# Patient Record
Sex: MALE | Race: WHITE | Employment: UNEMPLOYED | URBAN - NONMETROPOLITAN AREA
[De-identification: names, ages, dates, MRNs, and addresses within clinical notes are randomized per-mention and may not be internally consistent; named-entity substitution may affect disease eponyms.]

---

## 2021-06-15 ENCOUNTER — HOSPITAL ENCOUNTER (OUTPATIENT)
Dept: CT IMAGING | Age: 51
Discharge: HOME | End: 2021-06-15

## 2021-06-15 DIAGNOSIS — Z00.6 EXAMINATION FOR NORMAL COMPARISON FOR CLINICAL RESEARCH: ICD-10-CM

## 2021-06-17 ENCOUNTER — OFFICE VISIT (OUTPATIENT)
Dept: SURGERY | Age: 51
End: 2021-06-17
Payer: COMMERCIAL

## 2021-06-17 ENCOUNTER — TELEPHONE (OUTPATIENT)
Dept: SURGERY | Age: 51
End: 2021-06-17

## 2021-06-17 VITALS
DIASTOLIC BLOOD PRESSURE: 78 MMHG | RESPIRATION RATE: 18 BRPM | TEMPERATURE: 97.6 F | OXYGEN SATURATION: 97 % | BODY MASS INDEX: 31.52 KG/M2 | HEART RATE: 83 BPM | SYSTOLIC BLOOD PRESSURE: 110 MMHG | WEIGHT: 208 LBS | HEIGHT: 68 IN

## 2021-06-17 DIAGNOSIS — R10.32 BILATERAL GROIN PAIN: Primary | ICD-10-CM

## 2021-06-17 DIAGNOSIS — R10.31 BILATERAL GROIN PAIN: Primary | ICD-10-CM

## 2021-06-17 PROCEDURE — G8417 CALC BMI ABV UP PARAM F/U: HCPCS | Performed by: SURGERY

## 2021-06-17 PROCEDURE — G8427 DOCREV CUR MEDS BY ELIG CLIN: HCPCS | Performed by: SURGERY

## 2021-06-17 PROCEDURE — 3017F COLORECTAL CA SCREEN DOC REV: CPT | Performed by: SURGERY

## 2021-06-17 PROCEDURE — 99203 OFFICE O/P NEW LOW 30 MIN: CPT | Performed by: SURGERY

## 2021-06-17 PROCEDURE — 1036F TOBACCO NON-USER: CPT | Performed by: SURGERY

## 2021-06-17 RX ORDER — LANOLIN ALCOHOL/MO/W.PET/CERES
1 CREAM (GRAM) TOPICAL DAILY
COMMUNITY
Start: 2021-06-01

## 2021-06-17 RX ORDER — ASCORBIC ACID 500 MG
500 TABLET ORAL DAILY
COMMUNITY

## 2021-06-17 RX ORDER — MELATONIN
1 DAILY
COMMUNITY
Start: 2021-06-15

## 2021-06-17 RX ORDER — CETIRIZINE HYDROCHLORIDE 10 MG/1
1 TABLET ORAL DAILY
COMMUNITY
Start: 2021-06-15

## 2021-06-17 RX ORDER — FLUOXETINE HYDROCHLORIDE 40 MG/1
1 CAPSULE ORAL NIGHTLY
COMMUNITY
Start: 2021-04-28

## 2021-06-17 RX ORDER — FAMOTIDINE 20 MG/1
1 TABLET, FILM COATED ORAL NIGHTLY
COMMUNITY
Start: 2021-05-06

## 2021-06-17 ASSESSMENT — ENCOUNTER SYMPTOMS
WHEEZING: 0
COLOR CHANGE: 0
NAUSEA: 0
BLOOD IN STOOL: 0
SHORTNESS OF BREATH: 0
COUGH: 0
SORE THROAT: 0
TROUBLE SWALLOWING: 0
VOMITING: 0
ABDOMINAL PAIN: 0

## 2021-06-17 NOTE — PROGRESS NOTES
Lloyd Arias MD   General Surgery  New Patient Evaluation in Office  Pt Name: Shilpa Jameson  Date of Birth 1970   Today's Date: 6/17/2021  Medical Record Number: [id number]  Referring Provider: Phoenix Brennan MD  Primary Care Provider: TERESA Max CNP  Chief Complaint:  Chief Complaint   Patient presents with    Surgical Consult     New patient ref Dr. Bradley Hernandez-- bilat inguinal hernia & umbilical hernia-- has colonoscopy 7/1 with Dr. Prosper Hutchison      1. Bilateral groin pain    2. Possible recurrent left inguinal hernia   3. Right inguinal floor weakness  PLANS      1. Outside imaging reviewed. Reported recurrent left inguinal hernia containing colon which I can visualize but cannot palpate on exam.  He does have weakness in the right inguinal floor. 2.  Schedule patient for bilateral ultrasound to further evaluate. Follow-up in office following ultrasound exam to see if can see peristalsing bowel in the left groin. He may have significant lipomas of the cord as well. Reexamine on next visit. 3.  Patient has continued symptomatology and pending ultrasound and reexamination may warrant laparoscopy and repair of recurrent hernia and primary repair of right inguinal hernia. Patient expressed understanding all questions answered. He was reassured he does not need any urgent or emergent surgery at present. Johanna Camejo is a 46y.o. year old male who is presenting today in the office for evaluation of a recurrent recurrent left inguinal hernia and possible right inguinal hernia. He has gone open repair of left inguinal hernia x2 in the past both by an open approach his first was several years ago and was performed in [facility]. In 2020 he had an open repair of a recurrent left inguinal hernia at [facility] in [place], [place].    He was having some right groin discomfort was seen apparently in the emergency department there and had a CT scan of the abdomen and pelvis performed. Showed the sigmoid colon in the left groin and a 5 cm hernia in the right groin. He has a weakness in the floor of the right inguinal canal but no significant herniating contents. I could not discretely palpate herniating bowel or herniation on the left today on examination. He denies any testicular pain. He has scarring at the umbilicus but no true herniating abdominal contents on CT scan. Patient was referred for laparoscopic capable surgeon. He denies any current testicular pain or bulging in either groin. He does complain of some symptoms of sciatica. He complains of pain with cough or heavy lifting. Past Medical History  History reviewed. No pertinent past medical history. Past Surgical History  Past Surgical History:   Procedure Laterality Date    HERNIA REPAIR  60/27/4752    umbilical hernia-- recurrent left inguinal hernia    INGUINAL HERNIA REPAIR Left 2014       Medications  Current Outpatient Medications   Medication Sig Dispense Refill    famotidine (PEPCID) 20 MG tablet Take 1 tablet by mouth nightly      FLUoxetine (PROZAC) 40 MG capsule Take 1 capsule by mouth nightly      vitamin B-12 (CYANOCOBALAMIN) 1000 MCG tablet Take 1 tablet by mouth daily      vitamin D3 (CHOLECALCIFEROL) 25 MCG (1000 UT) TABS tablet Take 1 tablet by mouth daily      cetirizine (ZYRTEC) 10 MG tablet Take 1 tablet by mouth daily      ascorbic acid (VITAMIN C) 500 MG tablet Take 500 mg by mouth daily       No current facility-administered medications for this visit.      Allergies   No Known Allergies    Family History  Family History   Problem Relation Age of Onset    Emphysema Mother     Prostate Cancer Father     Prostate Cancer Paternal Grandfather        SocialHistory  Social History     Socioeconomic History    Marital status:      Spouse name: Not on file    Number of children: Not on file    Years of education: Not on file    Highest education level: Not on file   Occupational History    Not on file   Tobacco Use    Smoking status: Never Smoker    Smokeless tobacco: Never Used   Substance and Sexual Activity    Alcohol use: Never    Drug use: Never    Sexual activity: Not on file   Other Topics Concern    Not on file   Social History Narrative    Not on file     Social Determinants of Health     Financial Resource Strain:     Difficulty of Paying Living Expenses:    Food Insecurity:     Worried About Running Out of Food in the Last Year:     [address] in the Last Year:    Transportation Needs:     Lack of Transportation (Medical):  Lack of Transportation (Non-Medical):    Physical Activity:     Days of Exercise per Week:     Minutes of Exercise per Session:    Stress:     Feeling of Stress :    Social Connections:     Frequency of Communication with Friends and Family:     Frequency of Social Gatherings with Friends and Family:     Attends Hinduism Services:     Active Member of Clubs or Organizations:     Attends Club or Organization Meetings:     Marital Status:    Intimate Partner Violence:     Fear of Current or Ex-Partner:     Emotionally Abused:     Physically Abused:     Sexually Abused:            Review of Systems  Review of Systems   Constitutional: Negative for chills, fatigue, fever and unexpected weight change. HENT: Negative for sore throat and trouble swallowing. Eyes: Negative for visual disturbance. Respiratory: Negative for cough, shortness of breath and wheezing. Cardiovascular: Negative for chest pain and palpitations. Gastrointestinal: Negative for abdominal pain, blood in stool, nausea and vomiting. Endocrine: Negative for cold intolerance, heat intolerance and polydipsia. Genitourinary: Negative for decreased urine volume, dysuria, flank pain, hematuria, scrotal swelling and testicular pain. Musculoskeletal: Negative for gait problem, joint swelling and myalgias.    Skin: Negative for color change and rash. Allergic/Immunologic: Negative for immunocompromised state. Neurological: Negative for dizziness, tremors, seizures and speech difficulty. Hematological: Does not bruise/bleed easily. Psychiatric/Behavioral: Negative for behavioral problems, confusion and suicidal ideas. OBJECTIVE     /78 (Site: Right Upper Arm, Position: Sitting, Cuff Size: Medium Adult)   Pulse 83   Temp 97.6 °F (36.4 °C) (Temporal)   Resp 18   Ht 5' 8\" (1.727 m)   Wt 208 lb (94.3 kg)   SpO2 97%   BMI 31.63 kg/m²      Physical Exam  Vitals reviewed. Constitutional:       Appearance: He is well-developed. He is not ill-appearing or diaphoretic. HENT:      Head: Normocephalic and atraumatic. Eyes:      Extraocular Movements: Extraocular movements intact. Pupils: Pupils are equal, round, and reactive to light. Neck:      Thyroid: No thyromegaly. Vascular: No JVD. Trachea: No tracheal deviation. Cardiovascular:      Rate and Rhythm: Normal rate and regular rhythm. Heart sounds: Normal heart sounds. No murmur heard. Pulmonary:      Effort: Pulmonary effort is normal. No respiratory distress. Breath sounds: Normal breath sounds. No wheezing. Abdominal:      General: Bowel sounds are normal. There is no distension. Palpations: Abdomen is soft. Tenderness: There is no abdominal tenderness. There is no guarding or rebound. Comments: Weakness floor of right inguinal canal.  No significant herniating contents with Valsalva. No herniating contents with Valsalva in the left groin on exam today   Musculoskeletal:      Cervical back: No rigidity. Right lower leg: No edema. Left lower leg: No edema. Skin:     General: Skin is warm and dry. Coloration: Skin is not jaundiced or pale. Findings: No rash. Neurological:      General: No focal deficit present. Mental Status: He is alert and oriented to person, place, and time.

## 2021-06-17 NOTE — TELEPHONE ENCOUNTER
Spoke w/ pt, bilateral groin US scheduled [facility] 6/21 @ 10:30 am.  F/U with Dr. Charles Aranda scheduled 7/12 @ 11:45 am.  Pt voiced understanding    Orders faxed to [facility] F# [phone]

## 2021-06-29 ENCOUNTER — HOSPITAL ENCOUNTER (OUTPATIENT)
Dept: ULTRASOUND IMAGING | Age: 51
Discharge: HOME | End: 2021-06-29
Payer: COMMERCIAL

## 2021-06-29 DIAGNOSIS — Z00.6 EXAMINATION FOR NORMAL COMPARISON FOR CLINICAL RESEARCH: ICD-10-CM

## 2021-07-12 ENCOUNTER — TELEPHONE (OUTPATIENT)
Dept: SURGERY | Age: 51
End: 2021-07-12

## 2021-07-12 NOTE — TELEPHONE ENCOUNTER
Results of US given to patient- pt voices understanding- to keep appointment made for 7/19/21 to discuss further plan of care.

## 2021-10-18 ENCOUNTER — OFFICE VISIT (OUTPATIENT)
Dept: SURGERY | Age: 51
End: 2021-10-18
Payer: COMMERCIAL

## 2021-10-18 VITALS
OXYGEN SATURATION: 95 % | RESPIRATION RATE: 15 BRPM | SYSTOLIC BLOOD PRESSURE: 129 MMHG | HEART RATE: 73 BPM | WEIGHT: 210 LBS | DIASTOLIC BLOOD PRESSURE: 73 MMHG | TEMPERATURE: 97.4 F | BODY MASS INDEX: 31.83 KG/M2 | HEIGHT: 68 IN

## 2021-10-18 DIAGNOSIS — R10.32 BILATERAL GROIN PAIN: Primary | ICD-10-CM

## 2021-10-18 DIAGNOSIS — R10.31 BILATERAL GROIN PAIN: Primary | ICD-10-CM

## 2021-10-18 PROCEDURE — 99213 OFFICE O/P EST LOW 20 MIN: CPT | Performed by: SURGERY

## 2021-10-18 PROCEDURE — 1036F TOBACCO NON-USER: CPT | Performed by: SURGERY

## 2021-10-18 PROCEDURE — G8484 FLU IMMUNIZE NO ADMIN: HCPCS | Performed by: SURGERY

## 2021-10-18 PROCEDURE — G8427 DOCREV CUR MEDS BY ELIG CLIN: HCPCS | Performed by: SURGERY

## 2021-10-18 PROCEDURE — G8417 CALC BMI ABV UP PARAM F/U: HCPCS | Performed by: SURGERY

## 2021-10-18 PROCEDURE — 3017F COLORECTAL CA SCREEN DOC REV: CPT | Performed by: SURGERY

## 2021-10-18 ASSESSMENT — ENCOUNTER SYMPTOMS
SHORTNESS OF BREATH: 0
WHEEZING: 0
TROUBLE SWALLOWING: 0
NAUSEA: 0
ABDOMINAL PAIN: 0
VOMITING: 0
COLOR CHANGE: 0
BLOOD IN STOOL: 0
COUGH: 0

## 2021-10-18 NOTE — PROGRESS NOTES
Teresa Canales MD   General Surgery  New Patient Evaluation in Office  Pt Name: Theresa Sorenson  Date of Birth 1970   Today's Date: 10/18/2021  Medical Record Number: [id number]  Referring Provider: No ref. provider found  Primary Care Provider: TERESA Nelson CNP  Chief Complaint:  Chief Complaint   Patient presents with    Results     US groin 6/28 @ [facility]       ASSESSMENT      1. Bilateral groin pain    2. No clinically evident inguinal hernia on exam   3. Right inguinal floor weakness  4. Small incarcerated asymptomatic umbilical hernia  PLANS      1. Patient was seen in June. Imaging obtained possible small right inguinal hernia. He has been unable to follow-up here since that time. He did present to an outside emergency department in August for similar complaints of groin pain. No imaging at that time. On examination today no clinically significant hernia. Discussed with both patient and wife may simply be his old mesh causing him some intermittent discomfort. I do not feel he has a recurrent hernia or mandates exploration unless recurrent symptoms. He does not wish to proceed with any surgery. 2.  Should patient wish to proceed with any surgical intervention recommend robotic assisted laparoscopic pelvic exploration and repair of any identified hernia as indicated. 3.  Patient to follow-up in office as needed. Call if any worsening symptoms or concerns. 4.  Activity as tolerated. SUBJECTIVE   Of present illness:  Kassie Shcrader is a 46y.o. year old male who is presenting today in the office for evaluation of a recurrent recurrent left inguinal hernia and possible right inguinal hernia. He has gone open repair of left inguinal hernia x2 in the past both by an open approach his first was several years ago and was performed in [facility]. In 2020 he had an open repair of a recurrent left inguinal hernia at [facility] in [place], [place].    He was having some right groin discomfort was seen apparently in the emergency department there and had a CT scan of the abdomen and pelvis performed. Showed the sigmoid colon in the left groin and a 5 cm hernia in the right groin. He has a weakness in the floor of the right inguinal canal but no significant herniating contents. I could not discretely palpate herniating bowel or herniation on the left today on examination. He denies any testicular pain. He has scarring at the umbilicus but no true herniating abdominal contents on CT scan. Patient was referred for laparoscopic capable surgeon. He denies any current testicular pain or bulging in either groin. He does complain of some symptoms of sciatica. He complains of pain with cough or heavy lifting. Cedrick Giraldo has intermittent left groin pain no pain on the right. On examination today no evidence of clinically significant inguinal hernia which mandates surgery at this point. He complains of intermittent pain over the left groin none on the right. May be due to old mesh plug its not really palpable. He did have an ultrasound performed possible small right inguinal hernia. This is not clinically evident. He has a small incarcerated umbilical hernia that is asymptomatic. Surgery not mandatory he does not wish to proceed with any exploration at this time and I would agree with this. He is to call if any worsening symptoms or desires reexamination or reevaluation. Inguinal discomfort is controlled with Tylenol or ibuprofen. Past Medical History  History reviewed. No pertinent past medical history.     Past Surgical History  Past Surgical History:   Procedure Laterality Date    HERNIA REPAIR  38/24/8900    umbilical hernia-- recurrent left inguinal hernia    INGUINAL HERNIA REPAIR Left 2014       Medications  Current Outpatient Medications   Medication Sig Dispense Refill    famotidine (PEPCID) 20 MG tablet Take 1 tablet by mouth nightly      FLUoxetine (PROZAC) 40 MG capsule Take 1 capsule by mouth nightly      vitamin B-12 (CYANOCOBALAMIN) 1000 MCG tablet Take 1 tablet by mouth daily      vitamin D3 (CHOLECALCIFEROL) 25 MCG (1000 UT) TABS tablet Take 1 tablet by mouth daily      cetirizine (ZYRTEC) 10 MG tablet Take 1 tablet by mouth daily      ascorbic acid (VITAMIN C) 500 MG tablet Take 500 mg by mouth daily       No current facility-administered medications for this visit. Allergies   No Known Allergies    Family History  Family History   Problem Relation Age of Onset    Emphysema Mother     Prostate Cancer Father     Prostate Cancer Paternal Grandfather        SocialHistory  Social History     Socioeconomic History    Marital status:      Spouse name: Not on file    Number of children: Not on file    Years of education: Not on file    Highest education level: Not on file   Occupational History    Not on file   Tobacco Use    Smoking status: Never Smoker    Smokeless tobacco: Never Used   Substance and Sexual Activity    Alcohol use: Never    Drug use: Never    Sexual activity: Not on file   Other Topics Concern    Not on file   Social History Narrative    Not on file     Social Determinants of Health     Financial Resource Strain:     Difficulty of Paying Living Expenses:    Food Insecurity:     Worried About Running Out of Food in the Last Year:     [address] in the Last Year:    Transportation Needs:     Lack of Transportation (Medical):      Lack of Transportation (Non-Medical):    Physical Activity:     Days of Exercise per Week:     Minutes of Exercise per Session:    Stress:     Feeling of Stress :    Social Connections:     Frequency of Communication with Friends and Family:     Frequency of Social Gatherings with Friends and Family:     Attends Sabianism Services:     Active Member of Clubs or Organizations:     Attends Club or Organization Meetings:     Marital Status:    Intimate Partner Violence:     Fear of Current or Ex-Partner:     Emotionally Abused:     Physically Abused:     Sexually Abused:            Review of Systems  Review of Systems   Constitutional: Negative for chills, fatigue and fever. HENT: Negative for congestion and trouble swallowing. Eyes: Negative for visual disturbance. Respiratory: Negative for cough, shortness of breath and wheezing. Cardiovascular: Negative for chest pain and palpitations. Gastrointestinal: Negative for abdominal pain, blood in stool, nausea and vomiting. Intermittent left groin pain   Endocrine: Negative for cold intolerance, heat intolerance and polydipsia. Genitourinary: Negative for decreased urine volume, difficulty urinating, dysuria, flank pain, hematuria, scrotal swelling and testicular pain. Musculoskeletal: Negative for gait problem, joint swelling and myalgias. Skin: Negative for color change and rash. Allergic/Immunologic: Negative for immunocompromised state. Neurological: Negative for tremors, speech difficulty and headaches. Hematological: Does not bruise/bleed easily. Psychiatric/Behavioral: Positive for suicidal ideas. Negative for behavioral problems and confusion. OBJECTIVE     /73 (Site: Right Upper Arm, Position: Sitting, Cuff Size: Medium Adult)   Pulse 73   Temp 97.4 °F (36.3 °C) (Tympanic)   Resp 15   Ht 5' 8\" (1.727 m)   Wt 210 lb (95.3 kg)   SpO2 95%   BMI 31.93 kg/m²      Physical Exam  Vitals reviewed. Constitutional:       Appearance: Normal appearance. He is well-developed. He is not ill-appearing or diaphoretic. HENT:      Head: Normocephalic and atraumatic. Eyes:      General: No scleral icterus. Extraocular Movements: Extraocular movements intact. Pupils: Pupils are equal, round, and reactive to light. Neck:      Thyroid: No thyromegaly. Vascular: No JVD. Trachea: No tracheal deviation. Cardiovascular:      Rate and Rhythm: Normal rate.    Pulmonary:      Effort: Pulmonary effort is normal. No respiratory distress. Breath sounds: Normal breath sounds. No wheezing. Abdominal:      General: Bowel sounds are normal. There is no distension. Palpations: Abdomen is soft. Tenderness: There is no abdominal tenderness. There is no guarding or rebound. Comments: Small incarcerated umbilical hernia. No recurrent inguinal hernia palpable with Valsalva or standing   Musculoskeletal:      Right lower leg: No edema. Left lower leg: No edema. Skin:     General: Skin is warm and dry. Coloration: Skin is not jaundiced or pale. Findings: No rash. Neurological:      General: No focal deficit present. Mental Status: He is alert and oriented to person, place, and time. Cranial Nerves: No cranial nerve deficit.    Psychiatric:         Mood and Affect: Mood normal.         No results found for: WBC, HGB, HCT, PLT, CHOL, TRIG, HDL, LDLDIRECT, ALT, AST, NA, K, CL, CREATININE, BUN, CO2, TSH, PSA, INR, GLUF, LABA1C, LABMICR